# Patient Record
Sex: FEMALE | Race: BLACK OR AFRICAN AMERICAN | NOT HISPANIC OR LATINO | Employment: OTHER | URBAN - METROPOLITAN AREA
[De-identification: names, ages, dates, MRNs, and addresses within clinical notes are randomized per-mention and may not be internally consistent; named-entity substitution may affect disease eponyms.]

---

## 2023-03-03 PROBLEM — M19.90 ARTHRITIS: Status: ACTIVE | Noted: 2023-03-03

## 2023-03-03 PROBLEM — M65.341 TRIGGER RING FINGER OF RIGHT HAND: Status: ACTIVE | Noted: 2023-03-03

## 2023-03-03 PROBLEM — R52 BURNING PAIN: Status: ACTIVE | Noted: 2023-03-03

## 2023-03-03 PROBLEM — G25.0 ESSENTIAL TREMOR: Status: ACTIVE | Noted: 2023-03-03

## 2023-03-03 PROBLEM — A49.9 UTI (URINARY TRACT INFECTION), BACTERIAL: Status: ACTIVE | Noted: 2023-03-03

## 2023-03-03 PROBLEM — E78.5 HYPERLIPIDEMIA: Status: ACTIVE | Noted: 2023-03-03

## 2023-03-03 PROBLEM — M54.16 LUMBAR RADICULOPATHY: Status: ACTIVE | Noted: 2023-03-03

## 2023-03-03 PROBLEM — I73.9 PAD (PERIPHERAL ARTERY DISEASE) (CMS-HCC): Status: ACTIVE | Noted: 2023-03-03

## 2023-03-03 PROBLEM — R60.9 DEPENDENT EDEMA: Status: ACTIVE | Noted: 2023-03-03

## 2023-03-03 PROBLEM — F51.05 INSOMNIA SECONDARY TO ANXIETY: Status: ACTIVE | Noted: 2023-03-03

## 2023-03-03 PROBLEM — I65.29 CAROTID ATHEROSCLEROSIS: Status: ACTIVE | Noted: 2023-03-03

## 2023-03-03 PROBLEM — L29.9 PRURITUS: Status: ACTIVE | Noted: 2023-03-03

## 2023-03-03 PROBLEM — M48.061 LUMBAR CANAL STENOSIS: Status: ACTIVE | Noted: 2023-03-03

## 2023-03-03 PROBLEM — R35.0 INCREASED FREQUENCY OF URINATION: Status: ACTIVE | Noted: 2023-03-03

## 2023-03-03 PROBLEM — G60.9 IDIOPATHIC PERIPHERAL NEUROPATHY: Status: ACTIVE | Noted: 2023-03-03

## 2023-03-03 PROBLEM — F41.9 INSOMNIA SECONDARY TO ANXIETY: Status: ACTIVE | Noted: 2023-03-03

## 2023-03-03 PROBLEM — R60.9 EDEMA: Status: ACTIVE | Noted: 2023-03-03

## 2023-03-03 PROBLEM — K59.00 CONSTIPATION: Status: ACTIVE | Noted: 2023-03-03

## 2023-03-03 PROBLEM — I10 ESSENTIAL HYPERTENSION: Status: ACTIVE | Noted: 2023-03-03

## 2023-03-03 PROBLEM — R09.89 CAROTID BRUIT: Status: ACTIVE | Noted: 2023-03-03

## 2023-03-03 PROBLEM — I51.7 LEFT VENTRICULAR HYPERTROPHY: Status: ACTIVE | Noted: 2023-03-03

## 2023-03-03 PROBLEM — K62.89: Status: ACTIVE | Noted: 2023-03-03

## 2023-03-03 PROBLEM — G62.9 PERIPHERAL NEUROPATHY: Status: ACTIVE | Noted: 2023-03-03

## 2023-03-03 PROBLEM — E55.9 VITAMIN D DEFICIENCY: Status: ACTIVE | Noted: 2023-03-03

## 2023-03-03 PROBLEM — M81.0 OSTEOPOROSIS: Status: ACTIVE | Noted: 2023-03-03

## 2023-03-03 PROBLEM — R10.9 ABDOMINAL DISCOMFORT: Status: ACTIVE | Noted: 2023-03-03

## 2023-03-03 PROBLEM — R29.818 SUSPECTED SLEEP APNEA: Status: ACTIVE | Noted: 2023-03-03

## 2023-03-03 PROBLEM — E04.2 NONTOXIC MULTINODULAR GOITER: Status: ACTIVE | Noted: 2023-03-03

## 2023-03-03 PROBLEM — M26.69 TMJ INFLAMMATION: Status: ACTIVE | Noted: 2023-03-03

## 2023-03-03 PROBLEM — G57.90 NEUROPATHY, LEG: Status: ACTIVE | Noted: 2023-03-03

## 2023-03-03 PROBLEM — N32.81 OAB (OVERACTIVE BLADDER): Status: ACTIVE | Noted: 2023-03-03

## 2023-03-03 PROBLEM — L70.0 ACNE VULGARIS: Status: ACTIVE | Noted: 2023-03-03

## 2023-03-03 PROBLEM — R63.4 ABNORMAL WEIGHT LOSS: Status: ACTIVE | Noted: 2023-03-03

## 2023-03-03 PROBLEM — R35.1 NOCTURIA: Status: ACTIVE | Noted: 2023-03-03

## 2023-03-03 PROBLEM — N39.0 UTI (URINARY TRACT INFECTION), BACTERIAL: Status: ACTIVE | Noted: 2023-03-03

## 2023-03-03 PROBLEM — G47.30 APNEA, SLEEP: Status: ACTIVE | Noted: 2023-03-03

## 2023-03-03 PROBLEM — M79.643 HAND PAIN: Status: ACTIVE | Noted: 2023-03-03

## 2023-03-03 PROBLEM — H92.02 LEFT EAR PAIN: Status: ACTIVE | Noted: 2023-03-03

## 2023-03-03 PROBLEM — R32 URINARY INCONTINENCE: Status: ACTIVE | Noted: 2023-03-03

## 2023-03-03 PROBLEM — R30.0 DYSURIA: Status: ACTIVE | Noted: 2023-03-03

## 2023-03-03 PROBLEM — B35.1 ONYCHOMYCOSIS: Status: ACTIVE | Noted: 2023-03-03

## 2023-03-03 PROBLEM — R60.0 EDEMA OF RIGHT LOWER EXTREMITY: Status: ACTIVE | Noted: 2023-03-03

## 2023-03-03 RX ORDER — CLOPIDOGREL BISULFATE 75 MG/1
1 TABLET ORAL DAILY
COMMUNITY
End: 2023-12-30

## 2023-03-03 RX ORDER — METAXALONE 400 MG/1
TABLET ORAL
COMMUNITY
End: 2023-03-28

## 2023-03-03 RX ORDER — ATORVASTATIN CALCIUM 20 MG/1
1 TABLET, FILM COATED ORAL NIGHTLY
COMMUNITY
End: 2023-09-08

## 2023-03-03 RX ORDER — CICLOPIROX 80 MG/ML
SOLUTION TOPICAL
COMMUNITY

## 2023-03-03 RX ORDER — NEBIVOLOL 10 MG/1
1 TABLET ORAL DAILY
COMMUNITY
End: 2023-09-18

## 2023-03-03 RX ORDER — ERGOCALCIFEROL 1.25 MG/1
1 CAPSULE ORAL
COMMUNITY
End: 2024-03-26 | Stop reason: SDUPTHER

## 2023-03-03 RX ORDER — LEVETIRACETAM 500 MG/1
1 TABLET ORAL 2 TIMES DAILY
COMMUNITY

## 2023-03-03 RX ORDER — ALENDRONATE SODIUM 70 MG/1
TABLET ORAL
COMMUNITY
End: 2023-03-28

## 2023-03-03 RX ORDER — AMLODIPINE BESYLATE 10 MG/1
1 TABLET ORAL DAILY
COMMUNITY
End: 2023-09-28 | Stop reason: SDUPTHER

## 2023-03-03 RX ORDER — DICLOFENAC SODIUM 16.05 MG/ML
SOLUTION TOPICAL
COMMUNITY

## 2023-03-14 ENCOUNTER — APPOINTMENT (OUTPATIENT)
Dept: PRIMARY CARE | Facility: CLINIC | Age: 88
End: 2023-03-14
Payer: MEDICARE

## 2023-03-28 ENCOUNTER — OFFICE VISIT (OUTPATIENT)
Dept: PRIMARY CARE | Facility: CLINIC | Age: 88
End: 2023-03-28
Payer: MEDICARE

## 2023-03-28 VITALS
SYSTOLIC BLOOD PRESSURE: 135 MMHG | TEMPERATURE: 96.9 F | DIASTOLIC BLOOD PRESSURE: 69 MMHG | BODY MASS INDEX: 24.54 KG/M2 | HEART RATE: 70 BPM | HEIGHT: 60 IN | WEIGHT: 125 LBS

## 2023-03-28 DIAGNOSIS — E78.5 HYPERLIPIDEMIA, UNSPECIFIED HYPERLIPIDEMIA TYPE: Primary | ICD-10-CM

## 2023-03-28 DIAGNOSIS — N32.81 OAB (OVERACTIVE BLADDER): ICD-10-CM

## 2023-03-28 DIAGNOSIS — E55.9 VITAMIN D DEFICIENCY: ICD-10-CM

## 2023-03-28 DIAGNOSIS — G25.0 ESSENTIAL TREMOR: ICD-10-CM

## 2023-03-28 DIAGNOSIS — K62.89 RECTALGIA: ICD-10-CM

## 2023-03-28 PROCEDURE — 99214 OFFICE O/P EST MOD 30 MIN: CPT | Performed by: FAMILY MEDICINE

## 2023-03-28 PROCEDURE — 3078F DIAST BP <80 MM HG: CPT | Performed by: FAMILY MEDICINE

## 2023-03-28 PROCEDURE — 3075F SYST BP GE 130 - 139MM HG: CPT | Performed by: FAMILY MEDICINE

## 2023-03-28 PROCEDURE — 1159F MED LIST DOCD IN RCRD: CPT | Performed by: FAMILY MEDICINE

## 2023-03-28 PROCEDURE — 1036F TOBACCO NON-USER: CPT | Performed by: FAMILY MEDICINE

## 2023-03-28 PROCEDURE — 1160F RVW MEDS BY RX/DR IN RCRD: CPT | Performed by: FAMILY MEDICINE

## 2023-03-28 NOTE — PROGRESS NOTES
This is a 89-year-old female patient who is here with her daughter for follow-up    She had been to the neurologist and was told to see the PCP since the neurologist informed her that she may be dehydrated    The visit was at [facility] entity I reviewed the chart her sugar was slightly elevated 123 and her sodium was 140 slightly elevated from the norm    Patient's blood pressure is very good    She is very stable oriented looking extremely comfortable    Her only complaint today was the essential tremor and also at night she wakes up with increased urination as well as irritation around her rectum    Advised her to use diapers especially at night and also to apply a moisturizing the rectum and I reassured the patient    Advised her to come back for her annual physical

## 2023-06-05 ENCOUNTER — OFFICE VISIT (OUTPATIENT)
Dept: PRIMARY CARE | Facility: CLINIC | Age: 88
End: 2023-06-05
Payer: MEDICARE

## 2023-06-05 VITALS
DIASTOLIC BLOOD PRESSURE: 79 MMHG | HEIGHT: 60 IN | HEART RATE: 78 BPM | WEIGHT: 126 LBS | BODY MASS INDEX: 24.74 KG/M2 | SYSTOLIC BLOOD PRESSURE: 166 MMHG | TEMPERATURE: 97.5 F

## 2023-06-05 DIAGNOSIS — L29.9 ITCHING: Primary | ICD-10-CM

## 2023-06-05 DIAGNOSIS — R35.0 URINARY FREQUENCY: ICD-10-CM

## 2023-06-05 DIAGNOSIS — R30.0 DYSURIA: ICD-10-CM

## 2023-06-05 LAB
APPEARANCE UR: CLEAR
BILIRUB UR QL STRIP: NEGATIVE
COLOR UR: YELLOW
GLUCOSE UR STRIP-MCNC: NEGATIVE MG/DL
HGB UR QL STRIP: NEGATIVE
KETONES UR STRIP-MCNC: NEGATIVE MG/DL
LEUKOCYTE ESTERASE UR QL STRIP: ABNORMAL
NITRITE UR QL STRIP: NEGATIVE
PH UR STRIP: 5.5 [PH]
PROT UR STRIP-MCNC: NEGATIVE MG/DL
SP GR UR STRIP.AUTO: 1.01
UROBILINOGEN UR STRIP-ACNC: 0.2 E.U./DL

## 2023-06-05 PROCEDURE — 1036F TOBACCO NON-USER: CPT | Performed by: FAMILY MEDICINE

## 2023-06-05 PROCEDURE — 81003 URINALYSIS AUTO W/O SCOPE: CPT | Performed by: FAMILY MEDICINE

## 2023-06-05 PROCEDURE — 1159F MED LIST DOCD IN RCRD: CPT | Performed by: FAMILY MEDICINE

## 2023-06-05 PROCEDURE — 1160F RVW MEDS BY RX/DR IN RCRD: CPT | Performed by: FAMILY MEDICINE

## 2023-06-05 PROCEDURE — 3078F DIAST BP <80 MM HG: CPT | Performed by: FAMILY MEDICINE

## 2023-06-05 PROCEDURE — 3077F SYST BP >= 140 MM HG: CPT | Performed by: FAMILY MEDICINE

## 2023-06-05 PROCEDURE — 99213 OFFICE O/P EST LOW 20 MIN: CPT | Performed by: FAMILY MEDICINE

## 2023-06-05 RX ORDER — HYDROXYZINE HYDROCHLORIDE 10 MG/1
10 TABLET, FILM COATED ORAL NIGHTLY
Qty: 30 TABLET | Refills: 3 | Status: SHIPPED | OUTPATIENT
Start: 2023-06-05 | End: 2023-06-15

## 2023-09-08 DIAGNOSIS — E78.9 LIPID DISORDER: Primary | ICD-10-CM

## 2023-09-08 RX ORDER — ATORVASTATIN CALCIUM 20 MG/1
20 TABLET, FILM COATED ORAL NIGHTLY
Qty: 90 TABLET | Refills: 3 | Status: SHIPPED | OUTPATIENT
Start: 2023-09-08

## 2023-09-18 DIAGNOSIS — I10 HYPERTENSION, UNSPECIFIED TYPE: Primary | ICD-10-CM

## 2023-09-18 RX ORDER — NEBIVOLOL 10 MG/1
10 TABLET ORAL DAILY
Qty: 90 TABLET | Refills: 3 | Status: SHIPPED | OUTPATIENT
Start: 2023-09-18

## 2023-09-28 DIAGNOSIS — I10 PRIMARY HYPERTENSION: Primary | ICD-10-CM

## 2023-09-29 RX ORDER — AMLODIPINE BESYLATE 10 MG/1
10 TABLET ORAL DAILY
Qty: 90 TABLET | Refills: 1 | Status: SHIPPED | OUTPATIENT
Start: 2023-09-29 | End: 2024-03-08 | Stop reason: SDUPTHER

## 2023-10-03 ENCOUNTER — OFFICE VISIT (OUTPATIENT)
Dept: WOUND CARE | Facility: HOSPITAL | Age: 88
End: 2023-10-03
Payer: MEDICARE

## 2023-10-03 PROCEDURE — 99213 OFFICE O/P EST LOW 20 MIN: CPT

## 2023-12-28 DIAGNOSIS — E78.5 HYPERLIPIDEMIA, UNSPECIFIED HYPERLIPIDEMIA TYPE: Primary | ICD-10-CM

## 2023-12-30 RX ORDER — CLOPIDOGREL BISULFATE 75 MG/1
75 TABLET ORAL DAILY
Qty: 90 TABLET | Refills: 3 | Status: SHIPPED | OUTPATIENT
Start: 2023-12-30 | End: 2024-01-05 | Stop reason: SDUPTHER

## 2024-01-05 DIAGNOSIS — E78.5 HYPERLIPIDEMIA, UNSPECIFIED HYPERLIPIDEMIA TYPE: ICD-10-CM

## 2024-01-07 RX ORDER — CLOPIDOGREL BISULFATE 75 MG/1
75 TABLET ORAL DAILY
Qty: 4 TABLET | Refills: 0 | Status: SHIPPED | OUTPATIENT
Start: 2024-01-07 | End: 2024-03-27 | Stop reason: SDUPTHER

## 2024-01-23 ENCOUNTER — OFFICE VISIT (OUTPATIENT)
Dept: PRIMARY CARE | Facility: CLINIC | Age: 89
End: 2024-01-23
Payer: MEDICARE

## 2024-01-23 VITALS
HEART RATE: 66 BPM | WEIGHT: 117.7 LBS | BODY MASS INDEX: 23.11 KG/M2 | DIASTOLIC BLOOD PRESSURE: 68 MMHG | HEIGHT: 60 IN | TEMPERATURE: 97.9 F | SYSTOLIC BLOOD PRESSURE: 140 MMHG

## 2024-01-23 DIAGNOSIS — D64.9 ANEMIA, UNSPECIFIED TYPE: ICD-10-CM

## 2024-01-23 DIAGNOSIS — R35.0 URINARY FREQUENCY: Primary | ICD-10-CM

## 2024-01-23 DIAGNOSIS — E55.9 VITAMIN D DEFICIENCY: ICD-10-CM

## 2024-01-23 DIAGNOSIS — Z00.00 ROUTINE GENERAL MEDICAL EXAMINATION AT A HEALTH CARE FACILITY: ICD-10-CM

## 2024-01-23 DIAGNOSIS — R63.4 WEIGHT LOSS: ICD-10-CM

## 2024-01-23 DIAGNOSIS — E74.89 OTHER SPECIFIED DISORDERS OF CARBOHYDRATE METABOLISM (MULTI): ICD-10-CM

## 2024-01-23 LAB
25(OH)D3 SERPL-MCNC: 46 NG/ML (ref 30–100)
ALBUMIN SERPL BCP-MCNC: 4.6 G/DL (ref 3.4–5)
ALP SERPL-CCNC: 61 U/L (ref 33–136)
ALT SERPL W P-5'-P-CCNC: 11 U/L (ref 7–45)
ANION GAP SERPL CALC-SCNC: 17 MMOL/L (ref 10–20)
AST SERPL W P-5'-P-CCNC: 14 U/L (ref 9–39)
BASOPHILS # BLD AUTO: 0.04 X10*3/UL (ref 0–0.1)
BASOPHILS NFR BLD AUTO: 0.5 %
BILIRUB SERPL-MCNC: 0.4 MG/DL (ref 0–1.2)
BUN SERPL-MCNC: 23 MG/DL (ref 6–23)
CALCIUM SERPL-MCNC: 10.2 MG/DL (ref 8.6–10.6)
CHLORIDE SERPL-SCNC: 106 MMOL/L (ref 98–107)
CHOLEST SERPL-MCNC: 128 MG/DL (ref 0–199)
CHOLESTEROL/HDL RATIO: 2.7
CO2 SERPL-SCNC: 25 MMOL/L (ref 21–32)
CREAT SERPL-MCNC: 0.96 MG/DL (ref 0.5–1.05)
EGFRCR SERPLBLD CKD-EPI 2021: 54 ML/MIN/1.73M*2
EOSINOPHIL # BLD AUTO: 0.12 X10*3/UL (ref 0–0.4)
EOSINOPHIL NFR BLD AUTO: 1.4 %
ERYTHROCYTE [DISTWIDTH] IN BLOOD BY AUTOMATED COUNT: 12.6 % (ref 11.5–14.5)
EST. AVERAGE GLUCOSE BLD GHB EST-MCNC: 108 MG/DL
GLUCOSE SERPL-MCNC: 99 MG/DL (ref 74–99)
HBA1C MFR BLD: 5.4 %
HCT VFR BLD AUTO: 40.4 % (ref 36–46)
HDLC SERPL-MCNC: 47.8 MG/DL
HGB BLD-MCNC: 13.2 G/DL (ref 12–16)
IMM GRANULOCYTES # BLD AUTO: 0.02 X10*3/UL (ref 0–0.5)
IMM GRANULOCYTES NFR BLD AUTO: 0.2 % (ref 0–0.9)
LDLC SERPL CALC-MCNC: 61 MG/DL
LYMPHOCYTES # BLD AUTO: 1.6 X10*3/UL (ref 0.8–3)
LYMPHOCYTES NFR BLD AUTO: 18.5 %
MCH RBC QN AUTO: 32.9 PG (ref 26–34)
MCHC RBC AUTO-ENTMCNC: 32.7 G/DL (ref 32–36)
MCV RBC AUTO: 101 FL (ref 80–100)
MONOCYTES # BLD AUTO: 0.84 X10*3/UL (ref 0.05–0.8)
MONOCYTES NFR BLD AUTO: 9.7 %
NEUTROPHILS # BLD AUTO: 6.05 X10*3/UL (ref 1.6–5.5)
NEUTROPHILS NFR BLD AUTO: 69.7 %
NON HDL CHOLESTEROL: 80 MG/DL (ref 0–149)
NRBC BLD-RTO: 0 /100 WBCS (ref 0–0)
PLATELET # BLD AUTO: 210 X10*3/UL (ref 150–450)
POTASSIUM SERPL-SCNC: 5 MMOL/L (ref 3.5–5.3)
PROT SERPL-MCNC: 7.2 G/DL (ref 6.4–8.2)
RBC # BLD AUTO: 4.01 X10*6/UL (ref 4–5.2)
SODIUM SERPL-SCNC: 143 MMOL/L (ref 136–145)
TRIGL SERPL-MCNC: 94 MG/DL (ref 0–149)
TSH SERPL-ACNC: 1.37 MIU/L (ref 0.44–3.98)
VLDL: 19 MG/DL (ref 0–40)
WBC # BLD AUTO: 8.7 X10*3/UL (ref 4.4–11.3)

## 2024-01-23 PROCEDURE — 80061 LIPID PANEL: CPT

## 2024-01-23 PROCEDURE — 84443 ASSAY THYROID STIM HORMONE: CPT

## 2024-01-23 PROCEDURE — G0439 PPPS, SUBSEQ VISIT: HCPCS | Performed by: FAMILY MEDICINE

## 2024-01-23 PROCEDURE — 3077F SYST BP >= 140 MM HG: CPT | Performed by: FAMILY MEDICINE

## 2024-01-23 PROCEDURE — 80053 COMPREHEN METABOLIC PANEL: CPT

## 2024-01-23 PROCEDURE — 3078F DIAST BP <80 MM HG: CPT | Performed by: FAMILY MEDICINE

## 2024-01-23 PROCEDURE — 1036F TOBACCO NON-USER: CPT | Performed by: FAMILY MEDICINE

## 2024-01-23 PROCEDURE — 85025 COMPLETE CBC W/AUTO DIFF WBC: CPT

## 2024-01-23 PROCEDURE — 83036 HEMOGLOBIN GLYCOSYLATED A1C: CPT

## 2024-01-23 PROCEDURE — 82306 VITAMIN D 25 HYDROXY: CPT

## 2024-01-23 PROCEDURE — 36415 COLL VENOUS BLD VENIPUNCTURE: CPT

## 2024-01-23 PROCEDURE — 1126F AMNT PAIN NOTED NONE PRSNT: CPT | Performed by: FAMILY MEDICINE

## 2024-01-23 PROCEDURE — 1160F RVW MEDS BY RX/DR IN RCRD: CPT | Performed by: FAMILY MEDICINE

## 2024-01-23 ASSESSMENT — ENCOUNTER SYMPTOMS
CONSTITUTIONAL NEGATIVE: 1
APPETITE CHANGE: 0
CARDIOVASCULAR NEGATIVE: 1
RESPIRATORY NEGATIVE: 1

## 2024-01-23 NOTE — PROGRESS NOTES
Subjective   Patient ID: Alejandra Freitas is a 89 y.o. female who presents for Constipation and Dysmenorrhea.    Constipation         Review of Systems   Constitutional: Negative.  Negative for appetite change.   Respiratory: Negative.     Cardiovascular: Negative.        Objective   /68   Pulse 66   Temp 36.6 °C (97.9 °F)   Ht 1.524 m (5')   Wt 53.4 kg (117 lb 11.2 oz)   BMI 22.99 kg/m²     Physical Exam  HENT:      Head: Normocephalic.   Musculoskeletal:         General: Normal range of motion.   Skin:     General: Skin is warm.   Neurological:      General: No focal deficit present.      Mental Status: She is alert.   Psychiatric:         Mood and Affect: Mood normal.         Assessment/Plan     Brought in by her daughter complaining of constipation    And also odor in her urine    I also noticed that she is losing weight    The daughter says she is eating appetite is great do not know why she has been losing weight    No history of choking    On exam rectal exam no impacted stool but her muscles are atrophied and also in the skin in between the buttocks is extremely thin    I informed the daughter that she can develop a bedsore between the buttocks  cleft need need to change the position every 15 minutes and also to use moisturizer for the dry skin    Constipation could be due to dehydration and also weak muscles around the rectum    Advised daughter to give her prune juice prunes and also 15 mL Milk of Magnesia for her to make a bowel movement    Will do all the routine blood work and a urine analysis

## 2024-01-24 ENCOUNTER — LAB (OUTPATIENT)
Dept: LAB | Facility: LAB | Age: 89
End: 2024-01-24
Payer: MEDICARE

## 2024-01-24 DIAGNOSIS — R35.0 URINARY FREQUENCY: ICD-10-CM

## 2024-01-24 LAB
APPEARANCE UR: ABNORMAL
BACTERIA #/AREA URNS AUTO: ABNORMAL /HPF
BILIRUB UR STRIP.AUTO-MCNC: NEGATIVE MG/DL
COLOR UR: YELLOW
GLUCOSE UR STRIP.AUTO-MCNC: NEGATIVE MG/DL
KETONES UR STRIP.AUTO-MCNC: NEGATIVE MG/DL
LEUKOCYTE ESTERASE UR QL STRIP.AUTO: NEGATIVE
MUCOUS THREADS #/AREA URNS AUTO: ABNORMAL /LPF
NITRITE UR QL STRIP.AUTO: POSITIVE
PH UR STRIP.AUTO: 5 [PH]
PROT UR STRIP.AUTO-MCNC: NEGATIVE MG/DL
RBC # UR STRIP.AUTO: NEGATIVE /UL
RBC #/AREA URNS AUTO: ABNORMAL /HPF
SP GR UR STRIP.AUTO: 1.01
UROBILINOGEN UR STRIP.AUTO-MCNC: <2 MG/DL
WBC #/AREA URNS AUTO: ABNORMAL /HPF

## 2024-01-24 PROCEDURE — 81001 URINALYSIS AUTO W/SCOPE: CPT

## 2024-01-24 RX ORDER — CIPROFLOXACIN 250 MG/1
250 TABLET, FILM COATED ORAL 2 TIMES DAILY
Qty: 14 TABLET | Refills: 0 | Status: SHIPPED | OUTPATIENT
Start: 2024-01-24 | End: 2024-01-31

## 2024-03-08 DIAGNOSIS — I10 PRIMARY HYPERTENSION: ICD-10-CM

## 2024-03-08 RX ORDER — AMLODIPINE BESYLATE 10 MG/1
10 TABLET ORAL DAILY
Qty: 90 TABLET | Refills: 1 | Status: SHIPPED | OUTPATIENT
Start: 2024-03-08 | End: 2024-03-11 | Stop reason: SDUPTHER

## 2024-03-11 DIAGNOSIS — I10 PRIMARY HYPERTENSION: ICD-10-CM

## 2024-03-11 RX ORDER — AMLODIPINE BESYLATE 10 MG/1
10 TABLET ORAL DAILY
Qty: 90 TABLET | Refills: 1 | Status: SHIPPED | OUTPATIENT
Start: 2024-03-11

## 2024-03-13 ENCOUNTER — OFFICE VISIT (OUTPATIENT)
Dept: PRIMARY CARE | Facility: CLINIC | Age: 89
End: 2024-03-13
Payer: MEDICARE

## 2024-03-13 VITALS
HEART RATE: 62 BPM | HEIGHT: 60 IN | DIASTOLIC BLOOD PRESSURE: 62 MMHG | TEMPERATURE: 97.8 F | SYSTOLIC BLOOD PRESSURE: 135 MMHG | BODY MASS INDEX: 22.93 KG/M2 | WEIGHT: 116.8 LBS

## 2024-03-13 DIAGNOSIS — E78.5 HYPERLIPIDEMIA, UNSPECIFIED HYPERLIPIDEMIA TYPE: ICD-10-CM

## 2024-03-13 DIAGNOSIS — R63.4 WEIGHT LOSS: ICD-10-CM

## 2024-03-13 DIAGNOSIS — R73.9 HIGH BLOOD SUGAR: Primary | ICD-10-CM

## 2024-03-13 DIAGNOSIS — G25.0 ESSENTIAL TREMOR: ICD-10-CM

## 2024-03-13 LAB — HBA1C MFR BLD: 6 % (ref 4.2–6.5)

## 2024-03-13 PROCEDURE — 1159F MED LIST DOCD IN RCRD: CPT | Performed by: FAMILY MEDICINE

## 2024-03-13 PROCEDURE — 3075F SYST BP GE 130 - 139MM HG: CPT | Performed by: FAMILY MEDICINE

## 2024-03-13 PROCEDURE — 99214 OFFICE O/P EST MOD 30 MIN: CPT | Performed by: FAMILY MEDICINE

## 2024-03-13 PROCEDURE — 1036F TOBACCO NON-USER: CPT | Performed by: FAMILY MEDICINE

## 2024-03-13 PROCEDURE — 3078F DIAST BP <80 MM HG: CPT | Performed by: FAMILY MEDICINE

## 2024-03-13 PROCEDURE — 83036 HEMOGLOBIN GLYCOSYLATED A1C: CPT | Mod: CLIA WAIVED TEST | Performed by: FAMILY MEDICINE

## 2024-03-13 ASSESSMENT — ENCOUNTER SYMPTOMS
DEPRESSION: 0
OCCASIONAL FEELINGS OF UNSTEADINESS: 1
LOSS OF SENSATION IN FEET: 0

## 2024-03-13 NOTE — PATIENT INSTRUCTIONS
Weight November 2022 was 129    June of 2023 was 126    January 2024 was 117    Today the weight is 116      I am going to place a referral for her to see the nutritionist    Reason that you are using the bathroom to urinate is due to sleep apnea but that is okay we have spoken about it earlier    I will place a referral for her to see the nutritionist ; also to try to drink 1 boost every day

## 2024-03-13 NOTE — PROGRESS NOTES
This is a 89-year-old female patient accompanied by her daughter    The daughter says her mother is losing a lot of weight but her appetite is the same    No diarrhea no difficulty in swallowing    Daughter had taken her to the geriatric center at the [facility] lab work was done I reviewed their notes TSH was normal A1c was normal but I repeated the A1c here to see whether she has any alcohol or diabetes for weight loss    She had hash brown mccoy eggs and toast for breakfast and she will have dinner tonight    Also in between she has been snacking    Daughter feels that the constipation is getting worse and also patient feels discomfort in her stomach    For this reason I will order a CT of the abdomen without contrast and also referred her to GI    She says that she has nocturia and I explained to her it could be sleep apnea but patient does not want to have a sleep apnea test    I also referred her to the nutritionist to talk more about the nutrition    I informed the daughter that I reviewed her weight from 22 in 22 November it was 129 and then June last year was 125 and January this year was 117 and today 116    I advised her to come back in 3 months    Patient want to come off of amlodipine since her legs swell up but I informed her since we are trying to investigate why she is losing weight I do not want her to start a new medicine

## 2024-03-22 ENCOUNTER — APPOINTMENT (OUTPATIENT)
Dept: RADIOLOGY | Facility: HOSPITAL | Age: 89
End: 2024-03-22
Payer: MEDICARE

## 2024-03-26 DIAGNOSIS — E55.9 VITAMIN D DEFICIENCY: Primary | ICD-10-CM

## 2024-03-26 RX ORDER — ERGOCALCIFEROL 1.25 MG/1
1 CAPSULE ORAL
Qty: 90 CAPSULE | Refills: 3 | Status: SHIPPED | OUTPATIENT
Start: 2024-03-26

## 2024-03-27 DIAGNOSIS — E78.5 HYPERLIPIDEMIA, UNSPECIFIED HYPERLIPIDEMIA TYPE: ICD-10-CM

## 2024-03-27 RX ORDER — CLOPIDOGREL BISULFATE 75 MG/1
75 TABLET ORAL DAILY
Qty: 4 TABLET | Refills: 0 | Status: SHIPPED | OUTPATIENT
Start: 2024-03-27 | End: 2024-04-02 | Stop reason: SDUPTHER

## 2024-04-02 DIAGNOSIS — E78.5 HYPERLIPIDEMIA, UNSPECIFIED HYPERLIPIDEMIA TYPE: ICD-10-CM

## 2024-04-02 RX ORDER — CLOPIDOGREL BISULFATE 75 MG/1
75 TABLET ORAL DAILY
Qty: 4 TABLET | Refills: 0 | Status: SHIPPED | OUTPATIENT
Start: 2024-04-02 | End: 2024-04-05 | Stop reason: SDUPTHER

## 2024-04-02 NOTE — TELEPHONE ENCOUNTER
Pt needs more pills RX had dispense of 4 tablets instead of a full bottle and would like refills if possible.

## 2024-04-05 DIAGNOSIS — E78.5 HYPERLIPIDEMIA, UNSPECIFIED HYPERLIPIDEMIA TYPE: ICD-10-CM

## 2024-04-05 RX ORDER — CLOPIDOGREL BISULFATE 75 MG/1
75 TABLET ORAL DAILY
Qty: 90 TABLET | Refills: 2 | Status: SHIPPED | OUTPATIENT
Start: 2024-04-05 | End: 2024-04-09 | Stop reason: SDUPTHER

## 2024-04-09 DIAGNOSIS — E78.5 HYPERLIPIDEMIA, UNSPECIFIED HYPERLIPIDEMIA TYPE: ICD-10-CM

## 2024-04-09 RX ORDER — CLOPIDOGREL BISULFATE 75 MG/1
75 TABLET ORAL DAILY
Qty: 7 TABLET | Refills: 0 | Status: SHIPPED | OUTPATIENT
Start: 2024-04-09

## 2024-04-09 RX ORDER — CLOPIDOGREL BISULFATE 75 MG/1
75 TABLET ORAL DAILY
Qty: 7 TABLET | Refills: 0 | Status: SHIPPED | OUTPATIENT
Start: 2024-04-09 | End: 2024-04-09 | Stop reason: SDUPTHER

## 2024-04-17 ENCOUNTER — APPOINTMENT (OUTPATIENT)
Dept: NUTRITION | Facility: CLINIC | Age: 89
End: 2024-04-17
Payer: MEDICARE

## 2024-04-25 ENCOUNTER — HOSPITAL ENCOUNTER (OUTPATIENT)
Dept: RADIOLOGY | Facility: HOSPITAL | Age: 89
Discharge: HOME | End: 2024-04-25
Payer: MEDICARE

## 2024-04-25 DIAGNOSIS — R63.4 WEIGHT LOSS: ICD-10-CM

## 2024-04-25 PROCEDURE — 74150 CT ABDOMEN W/O CONTRAST: CPT

## 2024-07-09 ENCOUNTER — APPOINTMENT (OUTPATIENT)
Dept: PRIMARY CARE | Facility: CLINIC | Age: 89
End: 2024-07-09
Payer: MEDICARE

## 2024-07-09 DIAGNOSIS — E78.5 HYPERLIPIDEMIA, UNSPECIFIED HYPERLIPIDEMIA TYPE: ICD-10-CM

## 2024-07-09 RX ORDER — CLOPIDOGREL BISULFATE 75 MG/1
75 TABLET ORAL DAILY
Qty: 7 TABLET | Refills: 0 | Status: CANCELLED | OUTPATIENT
Start: 2024-07-09

## 2024-07-10 ENCOUNTER — APPOINTMENT (OUTPATIENT)
Dept: PRIMARY CARE | Facility: CLINIC | Age: 89
End: 2024-07-10
Payer: MEDICARE

## 2024-07-10 VITALS
DIASTOLIC BLOOD PRESSURE: 59 MMHG | WEIGHT: 111.9 LBS | HEIGHT: 60 IN | HEART RATE: 71 BPM | TEMPERATURE: 98.2 F | BODY MASS INDEX: 21.97 KG/M2 | SYSTOLIC BLOOD PRESSURE: 116 MMHG

## 2024-07-10 DIAGNOSIS — R46.89 BEHAVIOR CONCERN: ICD-10-CM

## 2024-07-10 DIAGNOSIS — R41.3 MEMORY CHANGE: Primary | ICD-10-CM

## 2024-07-10 DIAGNOSIS — E78.5 HYPERLIPIDEMIA, UNSPECIFIED HYPERLIPIDEMIA TYPE: ICD-10-CM

## 2024-07-10 PROCEDURE — 99214 OFFICE O/P EST MOD 30 MIN: CPT | Performed by: FAMILY MEDICINE

## 2024-07-10 PROCEDURE — 3074F SYST BP LT 130 MM HG: CPT | Performed by: FAMILY MEDICINE

## 2024-07-10 PROCEDURE — 1036F TOBACCO NON-USER: CPT | Performed by: FAMILY MEDICINE

## 2024-07-10 PROCEDURE — 3078F DIAST BP <80 MM HG: CPT | Performed by: FAMILY MEDICINE

## 2024-07-10 RX ORDER — CLOPIDOGREL BISULFATE 75 MG/1
75 TABLET ORAL DAILY
Qty: 7 TABLET | Refills: 0 | Status: SHIPPED | OUTPATIENT
Start: 2024-07-10 | End: 2024-07-10 | Stop reason: SDUPTHER

## 2024-07-10 RX ORDER — CLOPIDOGREL BISULFATE 75 MG/1
75 TABLET ORAL DAILY
Qty: 90 TABLET | Refills: 3 | Status: SHIPPED | OUTPATIENT
Start: 2024-07-10

## 2024-07-10 RX ORDER — CLOPIDOGREL BISULFATE 75 MG/1
75 TABLET ORAL DAILY
Qty: 30 TABLET | Refills: 5 | Status: SHIPPED | OUTPATIENT
Start: 2024-07-10 | End: 2025-01-06

## 2024-07-10 NOTE — PROGRESS NOTES
Today there was a family meeting between myself and the 2 children the youngest child Kasi and Anisha at the middle child the older child's Luke who was not here    Normally Anisha accompanies her mother to office but today Kasi also showed up since he is concerned about the new developments regarding his mother    There had been erratic behavior and memory problems    He says Anisha has been accused by the mother stating that Anisha is stealing her money and eventually Anisha finds the money for her    The 2 children states that she forgets where she puts her things and then she becomes irrational sometimes violent paranoid and accusing her daughter who is taking care of her    The daughter that is Anisha states that it is very difficult to take care of her mother she needs a break and she feels that it is affecting her health as well    I spoke to the patient and I also spoke to the family members to make a board to remind that the patient the mother raised the children with good values and then the children always finds her lost items    I explained to the son and the daughter that I am going to place a referral to the social work as well as to geriatric center    Currently she is being treated for chronic low back pain at the [facility] with injections    The son wants the patient to continue with Plavix therefore I renewed her Plavix ordered

## 2024-09-02 DIAGNOSIS — E78.9 LIPID DISORDER: ICD-10-CM

## 2024-09-03 RX ORDER — ATORVASTATIN CALCIUM 20 MG/1
20 TABLET, FILM COATED ORAL NIGHTLY
Qty: 90 TABLET | Refills: 3 | Status: SHIPPED | OUTPATIENT
Start: 2024-09-03

## 2024-09-10 DIAGNOSIS — I10 PRIMARY HYPERTENSION: ICD-10-CM

## 2024-09-10 RX ORDER — AMLODIPINE BESYLATE 10 MG/1
10 TABLET ORAL DAILY
Qty: 90 TABLET | Refills: 1 | Status: SHIPPED | OUTPATIENT
Start: 2024-09-10

## 2024-09-12 DIAGNOSIS — I10 HYPERTENSION, UNSPECIFIED TYPE: ICD-10-CM

## 2024-09-16 RX ORDER — NEBIVOLOL 10 MG/1
10 TABLET ORAL DAILY
Qty: 90 TABLET | Refills: 3 | Status: SHIPPED | OUTPATIENT
Start: 2024-09-16

## 2024-11-01 ENCOUNTER — ANTICOAGULATION - OTHER VISIT (DOAC) (OUTPATIENT)
Dept: PRIMARY CARE | Facility: CLINIC | Age: 89
End: 2024-11-01
Payer: MEDICARE

## 2024-11-01 DIAGNOSIS — E78.5 HYPERLIPIDEMIA, UNSPECIFIED HYPERLIPIDEMIA TYPE: Primary | ICD-10-CM

## 2024-11-01 DIAGNOSIS — E78.5 HYPERLIPIDEMIA, UNSPECIFIED HYPERLIPIDEMIA TYPE: ICD-10-CM

## 2024-11-01 RX ORDER — CLOPIDOGREL BISULFATE 75 MG/1
75 TABLET ORAL DAILY
Qty: 14 TABLET | Refills: 0 | Status: SHIPPED | OUTPATIENT
Start: 2024-11-01 | End: 2025-04-30

## 2024-11-01 RX ORDER — CLOPIDOGREL BISULFATE 75 MG/1
75 TABLET ORAL DAILY
Qty: 90 TABLET | Refills: 3 | Status: SHIPPED | OUTPATIENT
Start: 2024-11-01

## 2024-12-17 ENCOUNTER — APPOINTMENT (OUTPATIENT)
Dept: NEUROLOGY | Facility: CLINIC | Age: 89
End: 2024-12-17
Payer: MEDICARE

## 2024-12-17 ENCOUNTER — APPOINTMENT (OUTPATIENT)
Dept: GERIATRIC MEDICINE | Facility: CLINIC | Age: 89
End: 2024-12-17
Payer: MEDICARE

## 2024-12-17 ENCOUNTER — OFFICE VISIT (OUTPATIENT)
Dept: NEUROLOGY | Facility: CLINIC | Age: 89
End: 2024-12-17
Payer: MEDICARE

## 2024-12-17 VITALS — DIASTOLIC BLOOD PRESSURE: 70 MMHG | SYSTOLIC BLOOD PRESSURE: 159 MMHG | HEART RATE: 79 BPM

## 2024-12-17 DIAGNOSIS — G30.9 MIXED ALZHEIMER AND VASCULAR DEMENTIA: Primary | ICD-10-CM

## 2024-12-17 DIAGNOSIS — F02.80 MIXED ALZHEIMER AND VASCULAR DEMENTIA: Primary | ICD-10-CM

## 2024-12-17 DIAGNOSIS — R09.89 BRUIT OF LEFT CAROTID ARTERY: ICD-10-CM

## 2024-12-17 DIAGNOSIS — F01.50 MIXED ALZHEIMER AND VASCULAR DEMENTIA: Primary | ICD-10-CM

## 2024-12-17 PROCEDURE — 1036F TOBACCO NON-USER: CPT | Performed by: PSYCHIATRY & NEUROLOGY

## 2024-12-17 PROCEDURE — 3077F SYST BP >= 140 MM HG: CPT | Performed by: PSYCHIATRY & NEUROLOGY

## 2024-12-17 PROCEDURE — 99215 OFFICE O/P EST HI 40 MIN: CPT | Performed by: PSYCHIATRY & NEUROLOGY

## 2024-12-17 PROCEDURE — 3078F DIAST BP <80 MM HG: CPT | Performed by: PSYCHIATRY & NEUROLOGY

## 2024-12-17 PROCEDURE — 99205 OFFICE O/P NEW HI 60 MIN: CPT | Performed by: PSYCHIATRY & NEUROLOGY

## 2024-12-17 PROCEDURE — 1159F MED LIST DOCD IN RCRD: CPT | Performed by: PSYCHIATRY & NEUROLOGY

## 2024-12-17 RX ORDER — PREGABALIN 25 MG/1
25 CAPSULE ORAL 2 TIMES DAILY
COMMUNITY

## 2024-12-17 RX ORDER — RISPERIDONE 0.5 MG/1
0.5 TABLET ORAL 2 TIMES DAILY
Qty: 30 TABLET | Refills: 3 | Status: SHIPPED | OUTPATIENT
Start: 2024-12-17

## 2024-12-17 NOTE — PROGRESS NOTES
[place], [place]         Department of Neurology     [facility]     Initial Consultation          PCP:  Dr. Anitha Beavers        2024     Re: Alejandra Freitas    : 1926   MRN [id number]          CC: 89yo woman referred for the evaluation of mild cognitive impairment.     Info from pt and anil Anisha, son Kasi, and the EMR.          A&P:?History:  Pt has continued to have episodic function failures that have been accompanied by hallucinations and delusions with prominent accusations of her anil having stolen things she has put down because she can’t find them around the house.  Genl exam with Lt carotids 2/6 pansys harsh murmur.   Neuro exam with Lt worse than Rt distal hyporeflexia, gait instability, and poor coordination in upper and lower extremities.  Cognitive exam with attentional impersistence, poor motor control, attentional focus and motor disorganization. Testing: MMSE=20/30 c/w signficant impairment, AD8=5/8 suggesting persistent mood issues complicating her efforts.    Labs are unremarkable.  EKG w/ poor conduction with stAVB, RBBB, LAFB and BiFascic block.   Carotid Duplex with significant bilat disease, significant but not critical when last assessed.             Interpret: Pt with widespread cerebrovascular disease w/ episodic functional decline.          Plan: I will request an EEG to determine if there is an underlying seizure disorder that warrants the keppra, manda in the context of ongoing lyrica for chronic pain control.  I will request a brain MRI to assess the burden of cerebrovascular disease.  I will ask cardiology to assess the stability of her cardiac dysfunction and potential for medicinal stabilization.  I will start risperal 0.5HS to quiet her sleep and enhance the stability in her interpretations of her losing things around the house.            F/U:  I discussed these matters with the pt and her family. ?They asked  questions and showed good understanding. ?I will arrange RTC in 3 months & encouraged contact for issues arising.          Thank you for allowing me to participate in your care.     Sincerely yours, David Jones M.D., Ph.D.          History of Present Illness:      2009: Pt had two episodes in which the pt had new onset: ”something wasn’t right about her...and she was disorganized.”  2011: She was on the toilet and she became unresponsive, EMT saw her after full recovery and they did not take her to the hospital where it was thought she had a seizure and Keppra Rx.  2024: Pt with a year of behavioral issues, thinking her daughter is stealing from her.  Amadeo tells me that she is being accused of stealing when her mother misplaces things.  The pt’s amadeo says that her mother accuses her of stealing things.  The pt does not think that the “house has not been broken in to.”  Last night she told her daughter that she stole her black bag. Amadeo asked her where did you last have it, she said near the chair, and they went over and there it was.  Pt denies, but amadeo supports, that there is also some visual difficulty.  From time-to-time she sees people in the yard or in the trees.           Med Hx      Allergies: codeineà?,  losartanà?     Rx:  [id number],   [id number],   [id number] (Rxd for LBP),       pokdecd94,  [id number],          [id number]      H/O: carotid athero, ?CVA, HTN, HLD, PAD, PeriphArtDis, LtEar pain, wt loss, goiter,     osteoporosis, Ddefic, constip, polyuria/dysuria/freq/dysuria, UTI     anxiety, insomnia, RAYMOND,  ET, periph neurop, RtLE edema, LBP (CF pain mgmt.)     S/P: appy years ago     Implants: none     Trauma: none          FHx     Par: Dad coal mines, black lung;  Mom stopped eating d old age         Sibs:  bro and 6 sisters     Kids:  2 sons, 1 amadeo w/ Crohn’s          PSHx     Marital: , CVA  Home: [facility]    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   Employ:?retired      Driving:?stopped  Sleep: no  issues     Exercise: none  Firearms: gun locked     EtOH:   stopped       Smoking: stopped      Subs Abuse: none               ROS     Genl: w/ Skin-Skel arthritis Cardio-pulm no sxs   U/L-GI constipation        Neuro:  w/ LBP   w/o neck pain wnl w/o HAs wnl   w/o CVA, TIA, TMB,  VBI           Physical Exam     Vitals: WF=379/70, HR=79  , RR=16, cg=289 (a few ya she was 130)     General:  Neck FROM w/o pain  Eye gnds w/ nl discs & vsls      Lungs w/ clear,    RRR w/o MRG,  Lt carotids 2/6 pansys harsh M     Abdo soft +BS no bruit       Extrems w/o depend edema          Neurological Exam     Mental State: Affect:  some anxiety  limited range      no hallucins, delusions, or agitation at this time but anil tells me hallucins & delusions      (bunch of people across the street or hanging from trees)  in day & evening, not at night     Cranial Nerves:?Vision: VFF OU but does not sustain task to finger approach       Versions: FEOM horiz & vert  w/o nystagmus or diplopia  lids w/o ptosis     Motility:  intact saccs, brief pursuit, and hOKNs intact     Face: Sens symm to LT & cold,  ?   Expression: symm tone & mvmt     Hearing to voice wnl,   Tongue: mobile w/o fascics    Shoulders: symm mobile     Motor: strong proportionate to reduced bulk      w/o UE drift           w/ steady  bilat     tone: relaxed and symm         w/o invol mvmts  w/o synkinesia       Reflexes: MSRs +2 (subtle) UEs, +1 KJs and Rt AJ, absent Lt AJ     Release:  no Valle’s/grasp/palmoment,         Sensation:  symm LT, cold, and vib face ~ hands ~ ankles     -Romberg,  symm sway <1cm        Coordination: FT slow, struggled to get 1st-5th on either hand         FNF accurate & symm target       Gait: slow pace, short stride, no armswing,  fractionated turn 4-5 steps,       did not tandem b/o baseline instability          Cognitive Exam      Handedness:  fully RH          Language:  primary: American English      recept:  answers & follows instrucs  wnl         express: phrasesx3-5words    few short sents     speech init pauses    w/o paraphasias     repeat:  complete w nl pace      name:  body parts=3/3   objects=5/5         read:  pace & rhythm wnl          w/o paralexias          Praxis:  intrans: hand shapes to model=3/3 bilat transitive: pen twirling  Lt failed Rt failed      Luria Seq Learn: not done b/o pt could not sustain attn to simpler tasks          Percept:  visual: traffic signs=3/4,  imbedded objs (w/o clutter)=2/5        auditory: (pen click localiz)  fast & accurate      tactile: palm graphesthesia (XLTOC)   Lt wnl      Rt wnl          Attention: visual:   Lt/Rt w/o DSSE     tactile: Lt/Rt hand contact w/o DSSE wnl           Memory: visspat: room topology (door, pc, prev room) = /3      remote: USholidays= /3           Executive:  Color-name Stroop: names=0/9 errors        colors= 6/9 errors (2 prompted self- correct)     Calcs: simple subtract nl carry subtract fails x2 then spont ans correct,       simple multiplic nl            Testing MoCA= 20/30, mildly impaired     Mood:  AD8=5/8; signficant mood issues          Labs (to 9-):  wbc=6.56,    hct=39.1,    mmz=176   Keppra=39.0  A1c=6.0     glu=11, bun/crt=16/0.85,  GFR=62,     AST=17  ALT=8  AlkP=68      yucn=287, hdl=47.8, nonHDL=80,  ldl=61, chol/hdl=2.7, vldl=19, TG=94        TSH=2.3     fT4=1.1   B1=     S05=546,  Fol=14.1,  D3=30.3     UA (8-):  glu=neg, bili=neg, ket=neg, bld=mod, nitr=lrg, dip: bld=Lrg, evj=046, leuks=Lrg     EKG (6-4-2021): Sinus rate=71 NPhMh=100 Abnls: 1stAVB, RBBB,  LAFB --BiFascic block     Mod voltage criteria for LVH, may be normal variant, cannot rule out Septal infarct   Abnl ECG     Carotid Dup (10-): Rt Carotid: <50% stenosis of Rt prox ICA w/ laminar flow. Right ICA distal tortuosity. Rt ECA patent. Rt ECA prox seg degree stenosis may be underestimated due to calcfied shadowing plaque. RtCCA w/o hemodynamically signif stenosis. RtVert  and Rt subclav w/o signif stenosis.    Lt Carotid: <50% stenosis of Lt prox ICA w/ laminar flow.  LtECA patent w/o stenosis. LCCA w/o significant stenosis.  Lt vert waveform abnl c/w pre-steal. Turbulent flow in Lt subclavian; cannot rule out more proximal Lt subclav stenosis (also abnormal left vertebral artery Doppler waveform noted).   Imaging & Doppler Findings:  Heterogenous plaque in:: Proximal JENA, RtECA, Prox Rt CCA, MidRCCA, distal CCA & Rt carotid bulb, Prox Lt ICA, Prox Lt eECA, Prox LtCCA, Mid Lt CCA. Distal Lt CCA, and Lt carotid bulb.     Head CT (0-XYZ-20):       Brain MRI (0-XYZ-20):

## 2024-12-20 ENCOUNTER — HOSPITAL ENCOUNTER (OUTPATIENT)
Dept: RADIOLOGY | Facility: HOSPITAL | Age: 89
Discharge: HOME | End: 2024-12-20
Payer: MEDICARE

## 2024-12-20 ENCOUNTER — HOSPITAL ENCOUNTER (OUTPATIENT)
Dept: VASCULAR MEDICINE | Facility: HOSPITAL | Age: 89
Discharge: HOME | End: 2024-12-20
Payer: MEDICARE

## 2024-12-20 DIAGNOSIS — F02.80 MIXED ALZHEIMER AND VASCULAR DEMENTIA: ICD-10-CM

## 2024-12-20 DIAGNOSIS — F01.50 MIXED ALZHEIMER AND VASCULAR DEMENTIA: ICD-10-CM

## 2024-12-20 DIAGNOSIS — R09.89 BRUIT OF LEFT CAROTID ARTERY: ICD-10-CM

## 2024-12-20 DIAGNOSIS — I65.23 OCCLUSION AND STENOSIS OF BILATERAL CAROTID ARTERIES: ICD-10-CM

## 2024-12-20 DIAGNOSIS — G30.9 MIXED ALZHEIMER AND VASCULAR DEMENTIA: ICD-10-CM

## 2024-12-20 PROCEDURE — 93880 EXTRACRANIAL BILAT STUDY: CPT | Performed by: STUDENT IN AN ORGANIZED HEALTH CARE EDUCATION/TRAINING PROGRAM

## 2024-12-20 PROCEDURE — 93880 EXTRACRANIAL BILAT STUDY: CPT

## 2024-12-20 PROCEDURE — 70551 MRI BRAIN STEM W/O DYE: CPT

## 2024-12-23 ENCOUNTER — HOSPITAL ENCOUNTER (OUTPATIENT)
Dept: NEUROLOGY | Facility: HOSPITAL | Age: 89
Discharge: HOME | End: 2024-12-23
Payer: MEDICARE

## 2024-12-23 DIAGNOSIS — G30.9 MIXED ALZHEIMER AND VASCULAR DEMENTIA: ICD-10-CM

## 2024-12-23 DIAGNOSIS — F01.50 MIXED ALZHEIMER AND VASCULAR DEMENTIA: ICD-10-CM

## 2024-12-23 DIAGNOSIS — F02.80 MIXED ALZHEIMER AND VASCULAR DEMENTIA: ICD-10-CM

## 2024-12-23 PROCEDURE — 95819 EEG AWAKE AND ASLEEP: CPT

## 2025-01-02 ENCOUNTER — PATIENT MESSAGE (OUTPATIENT)
Dept: NEUROLOGY | Facility: CLINIC | Age: OVER 89
End: 2025-01-02
Payer: MEDICARE

## 2025-01-02 DIAGNOSIS — I65.23 ATHEROSCLEROSIS OF BOTH CAROTID ARTERIES: Primary | ICD-10-CM

## 2025-01-08 ENCOUNTER — APPOINTMENT (OUTPATIENT)
Dept: NEUROLOGY | Facility: HOSPITAL | Age: OVER 89
End: 2025-01-08
Payer: MEDICARE

## 2025-01-10 ENCOUNTER — OFFICE VISIT (OUTPATIENT)
Dept: NEUROLOGY | Facility: CLINIC | Age: OVER 89
End: 2025-01-10
Payer: MEDICARE

## 2025-01-10 VITALS
WEIGHT: 112 LBS | BODY MASS INDEX: 21.87 KG/M2 | DIASTOLIC BLOOD PRESSURE: 64 MMHG | RESPIRATION RATE: 16 BRPM | SYSTOLIC BLOOD PRESSURE: 152 MMHG | HEART RATE: 78 BPM

## 2025-01-10 DIAGNOSIS — G30.1 LATE ONSET ALZHEIMER'S DISEASE WITHOUT BEHAVIORAL DISTURBANCE (MULTI): Primary | ICD-10-CM

## 2025-01-10 DIAGNOSIS — F02.80 LATE ONSET ALZHEIMER'S DISEASE WITHOUT BEHAVIORAL DISTURBANCE (MULTI): Primary | ICD-10-CM

## 2025-01-10 PROCEDURE — 3077F SYST BP >= 140 MM HG: CPT | Performed by: PSYCHIATRY & NEUROLOGY

## 2025-01-10 PROCEDURE — 1036F TOBACCO NON-USER: CPT | Performed by: PSYCHIATRY & NEUROLOGY

## 2025-01-10 PROCEDURE — 3078F DIAST BP <80 MM HG: CPT | Performed by: PSYCHIATRY & NEUROLOGY

## 2025-01-10 PROCEDURE — 1159F MED LIST DOCD IN RCRD: CPT | Performed by: PSYCHIATRY & NEUROLOGY

## 2025-01-10 PROCEDURE — 99215 OFFICE O/P EST HI 40 MIN: CPT | Performed by: PSYCHIATRY & NEUROLOGY

## 2025-01-10 NOTE — PROGRESS NOTES
[place], [place]      Department of Neurology  [facility]     FU1 Consultation    PCP:  Dr. Anitha Beavers        January 10, 2025  Re: Alejandra Freitas    : 1926   MRN [id number]    CC: 89yo woman in care for cognitive impairment. Info per pt, amadeo [place], son Kasi, EMR.  HPI:  : Pt w/ two episodes of: ”something wasn't right about her...and she was disorganized.”  : She was on toilet and became unresponsive, EMT saw her after full recovery.  Hosp dxd seizure Rxd Keppra.  : 1y behavioral issues, thinking daughter stealing from her.  Amadeo tells me she is being accused of stealing when her mother misplaces things.  Pt sts “house has not been broken in to.”  Last night she told her daughter that she stole her black bag. Amadeo asked her where did you last have it, she said near the chair, and they went over and there it was.  Pt denies, but amadeo supports, that there also visual difficulty.  From time-to-time she sees people in the yard or in the trees. : Doing okay, sleeps overnight and naps in the day.    Med Hx   Allergies: codeineà?,  losartanà?  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, retired      Driving: stopped  Sleep: ok    Exercise: none   Firearms: gun locked     EtOH:   stopped       Smoking: stopped        ROS Genl: w/ Skin-Skel arthritis Cardio-pulm no sxs   U/L-GI constipation     Neuro:  w/ LBP   w/o neck pain wnl w/o HAs wnl   w/o  CVA, TIA, TMB,  VBI   Exam: LA=028/59, HR=71  , RR=16, wt=prev 111 (a few ya she was 130)  Genl:  Lungs w/ clear,  RRR w/o MRG,  Lt carotid 2/6 pansys harsh bruit as before   Abdo soft +BS no bruit       Extrems w/o depend edema  Neuro MS: Affect: some anxiety  limited range anil tells of prev hallucins & delusions   (bunch of people across the street or hanging from trees)  in day & evening, not at night  CN:  VFF OU sustain in task  FROM w/o nystagmus or ptosis w/ wnl EOMs  Face S&E wnl  Motor: strong, reduced bulk,  w/o UE drift  w/  steady  bilat  wnl tone, not inovl mvmts    MSRs +2 (subtle) UEs, +1 KJs and Rt AJ, absent Lt AJ  Sens:  symm LT, cold, and vib face ~ hands ~ ankles       -Romberg,  symm sway <1cm   Coordin: FT slow, struggles to get 1st-5th on either hand   Gait: slow pace, short stride, no armswing,  fractionated turn 4-5 steps,    Cognitive:  fully RH    Language:  recept: answers & follows brief comments wnl      express: few short sents w/ init pauses w/o paraphasias   repeat: complete w nl pace    name:  body parts=3/3   objects=5/5   read:  pace & rhythm wnl          w/o paralexias    Percep:  visual: traffic signs=3/4,  imbedded objs (w/o clutter)=2/5     Mem: recent:  remote: USholidays=3/3   Date  Juan 10, 2025  Prev Testing MoCA= 20/30, mildly impaired     Mood:  AD8=5/8; signficant mood issues  Labs (to 9-):  wbc=6.56,    hct=39.1,    jkb=396   Keppra=39.0  A1c=6.0  glu=11, bun/crt=16/0.85,  GFR=62,     AST=17  ALT=8  AlkP=68   teck=824, hdl=47.8, nonHDL=80,  ldl=61, chol/hdl=2.7, vldl=19, TG=94     TSH=2.3     fT4=1.1   B1=     I15=585,  Fol=14.1,  D3=30.3  UA (8-):  glu=neg, bili=neg, ket=neg, bld=mod, nitr=lrg, dip: bld=Lrg, alz=070, leuks=Lrg  EKG (6-4-2021): Sinus rate=71 NUwLa=643 Abnls: 1stAVB, RBBB,  LAFB --BiFascic block  Mod voltage criteria for LVH, may be normal variant, cannot rule out Septal infarct   Abnl ECG  Carotid Dup (10-): Rt Carotid: <50%  stenosis of Rt prox ICA w/ laminar flow. Right ICA distal tortuosity. Rt ECA patent. Rt ECA prox seg degree stenosis may be underestimated due to calcfied shadowing plaque. RtCCA w/o hemodynamically signif stenosis. RtVert and Rt subclav w/o signif stenosis.    Lt Carotid: <50% stenosis of Lt prox ICA w/ laminar flow.  LtECA patent w/o stenosis. LCCA w/o significant stenosis.  Lt vert waveform abnl c/w pre-steal. Turbulent flow in Lt subclavian; cannot rule out more proximal Lt subclav stenosis (also abnormal left vertebral artery Doppler waveform noted).   Imaging & Doppler Findings:  Heterogenous plaque in:: Proximal JNEA, RtECA, Prox Rt CCA, MidRCCA, distal CCA & Rt carotid bulb, Prox Lt ICA, Prox Lt eECA, Prox LtCCA, Mid Lt CCA. Distal Lt CCA, and Lt carotid bulb.  Carotid Dup (12-): Rt Carotid: c/w <50% stenosis. Right ICA distal segment appears to be tortuous. Right external carotid artery appears patent with no evidence of stenosis. Right vertebral artery is patent with antegrade flow. No evidence of hemodynamically significant stenosis in the right subclavian artery.  Left Carotid: Findings are consistent with less than 50% stenosis of the left proximal internal carotid artery. Laminar flow seen by color Doppler. Left external carotid artery appears patent with no evidence of stenosis. No evidence of hemodynamically significant stenosis of the left common carotid artery. The left vertebral artery demonstrates a high resistance waveform which may be suggestive of a more distal stenosis or occlusion. No evidence of hemodynamically significant stenosis in the left subclavian artery. There is a >50% stenosis noted in the left subclavian artery.  EEG (12-):  Diffuse encephalopathy; no epileptiform discharges or lateralizing signs.  Brain MRI (12-): 1. No evidence of acute infarct, mass, or shift.  2. Mod diffuse vol loss manda in parietal lobes for which Alzheimer's cannot be excluded.  3. Mild  WM microangiopathy.  4. 2.0 x 0.7 cm lesion in Rt frontal calvarium w/ edema and diffusion restriction. Findings are nonspecific and may represent osteoma or hemangioma, however a metastatic lesion can not be excluded. Consider CT for eval bone margins.  5. Soft tissue about dens with slight indentation of ventral aspect of the cervicomedullary cord, likely degenerative pannus though this may also be seen in the setting of rheumatoid arthritis.  A&P: History:  Pt has slowly progressive functional decline, proportionate to age.. Brain MRI shows Rt frontal skull lesion, extensively discussed with family.  We all agreed that we would not doing anything further in that regard unless circumstanced changed.  Genl exam with Lt carotid 2/6 pansys harsh murmur not audible or palpable today; I suspect she is less activated now that we know each other.  Neuro exam stable.  Remains sensible and brief but on topic and appropriate (2-4 word resps).  Tolerating the nightly Risperdal w/o difficulty, helps her get to sleep and sustains her calm demeanor EKG w/ poor conduction with stAVB, RBBB, LAFB and BiFascic block.   Carotid Duplex without signif flow limiting stenosis. Interpret: Pt with widespread cerebrovascular disease with bilat carotid patent.  Plan: Keppra, manda in the context of ongoing lyrica for chronic pain control.  I will request a brain MRI to assess the burden of cerebrovascular disease.  Risperdal 0.5HS well-tolerated.  F/U:  I discussed matters with pt and family.  They asked questions and showed good understanding.  I will arrange RTC in 3 months & encouraged contact for issues arising.   Thank you for allowing me to participate in your care.   Sincerely yours, David Jones M.D., Ph.D.

## 2025-01-17 DIAGNOSIS — E78.5 HYPERLIPIDEMIA, UNSPECIFIED HYPERLIPIDEMIA TYPE: ICD-10-CM

## 2025-01-20 RX ORDER — CLOPIDOGREL BISULFATE 75 MG/1
75 TABLET ORAL DAILY
Qty: 4 TABLET | Refills: 90 | Status: SHIPPED | OUTPATIENT
Start: 2025-01-20

## 2025-01-22 RX ORDER — CLOPIDOGREL BISULFATE 75 MG/1
75 TABLET ORAL DAILY
Qty: 90 TABLET | Refills: 3 | Status: SHIPPED | OUTPATIENT
Start: 2025-01-22 | End: 2025-07-21

## 2025-01-22 RX ORDER — CLOPIDOGREL BISULFATE 75 MG/1
75 TABLET ORAL DAILY
Qty: 10 TABLET | Refills: 0 | Status: SHIPPED | OUTPATIENT
Start: 2025-01-22 | End: 2025-02-01

## 2025-01-30 NOTE — PROGRESS NOTES
"Primary Care Physician: Anitha Beavers MD  Date of Visit: 01/31/2025  9:20 AM EST      Chief Complaint:     Pt referred by dr barrios for carotid bruit, mixed Alzheimer's disease     HPI / Summary:   Alejandra Freitas is a 89 y.o. female with history of cognitive impairment, seizures, possible prior CVA, HTN, mild carotid atherosclerosis    Accompanied by daughter today.  Denies any prior cardiac history.  Denies any chest pain or pressure.  No syncope.  Says she had a slight trip this morning while getting down the stairs but feels okay otherwise.  Per neurology note says \"will ask cardiology to assess the stability of her cardiac dysfunction and potential for medicinal stabilization.\"  She was started on Risperdal in the evening to help with sleep and stability in her interpretations of her losing things around the house.  According to daughter says this has helped slightly.    Carotid ultrasound 12/20/2024: Right carotid less than 50% stenosis, left carotid less than 50% stenosis, greater than 50% stenosis noted in left subclavian      Last Cardiology Tests:  ECG:     ECG 1/21/2025: NSR with HR 74 bpm, first-degree AVB ( MS), RBBB, LAFB presuming no arm lead reversal  ECG 6/24/2021: NSR with first-degree AVB ( MS), right bundle branch block, left anterior fascicular block       Echo:  [facility] 2015:  CONCLUSIONS:   - Technically difficult exam due to suboptimal positioning.   - Exam indication: TIA/CVA   - The left ventricle is normal in size. There is concentric left ventricular   hypertrophy. Left ventricular systolic function is normal. EF = 64 ± 5% (2D   biplane)   - The right ventricle is normal in size. Right ventricular systolic function is   mildly decreased.   - No prior echocardiographic exam available for comparison.     Cath:      Stress Test:    Cardiac Imaging:      Past Medical History:  Past Medical History:   Diagnosis Date    Candidiasis of skin and nail 12/14/2015    Candidal " intertrigo    Encounter for general adult medical examination without abnormal findings 09/23/2022    Physical exam, annual    Hypertensive urgency 06/23/2015    Hypertensive urgency    Other fecal abnormalities 11/10/2014    Loose stools    Other specified symptoms and signs involving the circulatory and respiratory systems 09/23/2021    Carotid bruit    Personal history of other diseases of the digestive system 01/06/2022    History of constipation    Personal history of other diseases of the musculoskeletal system and connective tissue 09/23/2021    History of arthritis    Personal history of other diseases of the musculoskeletal system and connective tissue 09/23/2021    History of osteoporosis    Personal history of other diseases of the musculoskeletal system and connective tissue 06/05/2014    History of low back pain    Personal history of other diseases of the musculoskeletal system and connective tissue 10/18/2013    History of osteopenia    Personal history of other specified conditions 09/11/2018    History of nocturia    Personal history of other specified conditions 11/15/2022    History of nocturia        Past Surgical History:  Past Surgical History:   Procedure Laterality Date    APPENDECTOMY  09/18/2014    Appendectomy    BLADDER SURGERY  10/18/2013    Bladder Surgery    BREAST SURGERY  10/18/2013    Breast Surgery Reduction Procedure    CATARACT EXTRACTION  10/21/2013    Cataract Surgery          Social History:  She reports that she has never smoked. She has never used smokeless tobacco. She reports that she does not drink alcohol and does not use drugs.    Family History:  family history includes Asthma in an other family member; Crohn's (Granulomatous) Colitis in her daughter; Hypertension in an other family member.      Allergies:  Allergies   Allergen Reactions    Codeine Unknown    Losartan Unknown       Outpatient Medications:  Current Outpatient Medications   Medication Instructions     amLODIPine (NORVASC) 10 mg, oral, Daily    atorvastatin (LIPITOR) 20 mg, oral, Nightly    ciclopirox (Penlac) 8 % solution Apply to affected nail beds and adjacent skin daily. Remove w/ alcohol every 7 days    clopidogrel (PLAVIX) 75 mg, oral, Daily    levETIRAcetam (Keppra) 500 mg tablet 1 tablet, 2 times daily    nebivolol (BYSTOLIC) 10 mg, oral, Daily    pregabalin (LYRICA) 25 mg, 2 times daily    risperiDONE (RISPERDAL) 0.5 mg, oral, 2 times daily       Review of Systems:  A complete 10 point ROS was performed and is negative except for HPI    Physical Exam:  GENERAL: alert, cooperative, pleasant, in no acute distress  SKIN: warm, dry, no rash.  NECK: no JVD, no DUANE  CARDIAC: Regular rate and rhythm with no rubs, murmurs, or gallops  CHEST: Normal respiratory efforts, lungs clear to auscultation bilaterally.  ABDOMEN: soft, nontender, nondistended  EXTREMITIES: no edema  NEURO: Alert and oriented x 3.  Grossly normal.  Moves all 4 extremities.    Vitals:    01/31/25 0947   BP: 151/67   BP Location: Left arm   Patient Position: Sitting   Pulse: 84   SpO2: 92%   Weight: 50.8 kg (112 lb)     Wt Readings from Last 5 Encounters:   01/10/25 50.8 kg (112 lb)   07/10/24 50.8 kg (111 lb 14.4 oz)   03/13/24 53 kg (116 lb 12.8 oz)   01/23/24 53.4 kg (117 lb 11.2 oz)   06/05/23 57.2 kg (126 lb)     Body mass index is 21.87 kg/m².        Last Labs:  CMP:  Recent Labs     01/23/24  1636 09/23/22  1334 06/24/21  1522    143 142   K 5.0 4.4 4.0    110* 107   CO2 25 25 26   ANIONGAP 17 12 13   BUN 23 18 18   CREATININE 0.96 0.85 0.80   EGFR 54*  --   --    GLUCOSE 99 92 101*     Recent Labs     01/23/24  1636 09/23/22  1334 03/23/22  1311   ALBUMIN 4.6 4.5 4.4   ALKPHOS 61 66 74   ALT 11 9 8   AST 14 17 15   BILITOT 0.4 0.4 0.4     CBC:  Recent Labs     01/23/24  1636 09/23/21  1209 06/24/21  1522   WBC 8.7 7.1 6.5   HGB 13.2 14.2 13.4   HCT 40.4 42.4 41.7    225 169   * 99 99     COAG: No results for  "input(s): \"INR\", \"DDIMERVTE\" in the last 42388 hours.  ENDO:  Recent Labs     03/13/24  1610 03/13/24  0847 01/23/24  1636 09/23/22  1334 09/23/21  1209 07/28/20  1021 05/11/20  1515   TSH  --   --  1.37 1.44 1.66 2.11  --    HGBA1C 6.0 5.6 5.4  --   --   --  5.9*      CARDIAC:   Recent Labs     06/24/21  1522   BNP 66     Recent Labs     01/23/24  1636 09/23/22  1334 09/23/21  1209 07/28/20  1021   CHOL 128 130 241* 138   LDLF  --  63 161* 65   LDLCALC 61  --   --   --    HDL 47.8 46.1 41.2 45.6   TRIG 94 104 194* 139               Assessment/Plan   89 y.o. female with history of cognitive impairment, seizures, possible prior CVA, HTN, mild carotid atherosclerosis.  No active cardiac concerns or symptoms.  She has conduction disease on her EKG including first-degree AVB, RBBB and LAFB that is stable since 2021.  QTc also normal.  Carotid ultrasounds unremarkable.  Okay to proceed with current meds. If she were to need additional Alzheimer's medicines that have heart rate lowering effect like Namenda or cholinesterase inhibitors etc. would consider either reducing nebivolol dose in the future or stopping completely as she is at risk for bradycardia.  No further cardiac workup recommended at this time.  Reassurance provided to patient and family.  Follow-up as needed.      Followup Appts:  Future Appointments   Date Time Provider Department Center   7/2/2025  8:00 AM Waldemar Clifford MD [id number]           ____________________________________________________________  Eduardo Bonilla DO  [facility]  [facility]  "

## 2025-01-31 ENCOUNTER — OFFICE VISIT (OUTPATIENT)
Dept: CARDIOLOGY | Facility: CLINIC | Age: OVER 89
End: 2025-01-31
Payer: MEDICARE

## 2025-01-31 ENCOUNTER — APPOINTMENT (OUTPATIENT)
Dept: CARDIOLOGY | Facility: CLINIC | Age: OVER 89
End: 2025-01-31
Payer: MEDICARE

## 2025-01-31 ENCOUNTER — HOSPITAL ENCOUNTER (OUTPATIENT)
Dept: CARDIOLOGY | Facility: HOSPITAL | Age: OVER 89
Discharge: HOME | End: 2025-01-31
Payer: MEDICARE

## 2025-01-31 VITALS
WEIGHT: 112 LBS | HEART RATE: 84 BPM | DIASTOLIC BLOOD PRESSURE: 67 MMHG | OXYGEN SATURATION: 92 % | SYSTOLIC BLOOD PRESSURE: 151 MMHG | BODY MASS INDEX: 21.87 KG/M2

## 2025-01-31 DIAGNOSIS — F01.50 MIXED ALZHEIMER AND VASCULAR DEMENTIA: ICD-10-CM

## 2025-01-31 DIAGNOSIS — R94.31 ABNORMAL EKG: ICD-10-CM

## 2025-01-31 DIAGNOSIS — G30.9 MIXED ALZHEIMER AND VASCULAR DEMENTIA: ICD-10-CM

## 2025-01-31 DIAGNOSIS — F02.80 MIXED ALZHEIMER AND VASCULAR DEMENTIA: ICD-10-CM

## 2025-01-31 DIAGNOSIS — R94.31 ABNORMAL EKG: Primary | ICD-10-CM

## 2025-01-31 LAB
ATRIAL RATE: 74 BPM
P AXIS: 115 DEGREES
P OFFSET: 162 MS
P ONSET: 104 MS
PR INTERVAL: 216 MS
Q ONSET: 212 MS
QRS COUNT: 12 BEATS
QRS DURATION: 138 MS
QT INTERVAL: 422 MS
QTC CALCULATION(BAZETT): 468 MS
QTC FREDERICIA: 452 MS
R AXIS: 257 DEGREES
T AXIS: 125 DEGREES
T OFFSET: 423 MS
VENTRICULAR RATE: 74 BPM

## 2025-01-31 PROCEDURE — 99213 OFFICE O/P EST LOW 20 MIN: CPT | Performed by: INTERNAL MEDICINE

## 2025-01-31 PROCEDURE — 3078F DIAST BP <80 MM HG: CPT | Performed by: INTERNAL MEDICINE

## 2025-01-31 PROCEDURE — 1036F TOBACCO NON-USER: CPT | Performed by: INTERNAL MEDICINE

## 2025-01-31 PROCEDURE — 93005 ELECTROCARDIOGRAM TRACING: CPT

## 2025-01-31 PROCEDURE — 99203 OFFICE O/P NEW LOW 30 MIN: CPT | Performed by: INTERNAL MEDICINE

## 2025-01-31 PROCEDURE — 3077F SYST BP >= 140 MM HG: CPT | Performed by: INTERNAL MEDICINE

## 2025-01-31 PROCEDURE — 1159F MED LIST DOCD IN RCRD: CPT | Performed by: INTERNAL MEDICINE

## 2025-02-12 ENCOUNTER — APPOINTMENT (OUTPATIENT)
Dept: PRIMARY CARE | Facility: CLINIC | Age: OVER 89
End: 2025-02-12
Payer: MEDICARE

## 2025-04-07 DIAGNOSIS — G30.9 MIXED ALZHEIMER AND VASCULAR DEMENTIA: ICD-10-CM

## 2025-04-07 DIAGNOSIS — F01.50 MIXED ALZHEIMER AND VASCULAR DEMENTIA: ICD-10-CM

## 2025-04-07 DIAGNOSIS — F02.80 MIXED ALZHEIMER AND VASCULAR DEMENTIA: ICD-10-CM

## 2025-04-07 RX ORDER — RISPERIDONE 0.5 MG/1
0.5 TABLET ORAL 2 TIMES DAILY
Qty: 30 TABLET | Refills: 3 | Status: SHIPPED | OUTPATIENT
Start: 2025-04-07

## 2025-05-02 ENCOUNTER — PATIENT MESSAGE (OUTPATIENT)
Dept: PRIMARY CARE | Facility: CLINIC | Age: OVER 89
End: 2025-05-02
Payer: MEDICARE

## 2025-05-02 DIAGNOSIS — E78.5 HYPERLIPIDEMIA, UNSPECIFIED HYPERLIPIDEMIA TYPE: Primary | ICD-10-CM

## 2025-05-04 RX ORDER — CLOPIDOGREL BISULFATE 75 MG/1
75 TABLET ORAL DAILY
Qty: 30 TABLET | Refills: 5 | Status: SHIPPED | OUTPATIENT
Start: 2025-05-04 | End: 2025-10-31

## 2025-06-25 ENCOUNTER — APPOINTMENT (OUTPATIENT)
Dept: NEUROLOGY | Facility: HOSPITAL | Age: OVER 89
End: 2025-06-25
Payer: MEDICARE

## 2025-07-02 ENCOUNTER — APPOINTMENT (OUTPATIENT)
Dept: NEUROLOGY | Facility: HOSPITAL | Age: OVER 89
End: 2025-07-02
Payer: MEDICARE